# Patient Record
(demographics unavailable — no encounter records)

---

## 2024-12-14 NOTE — HISTORY OF PRESENT ILLNESS
[de-identified] : Patient is a 51-year-old right-hand-dominant male presents with complaints of left biceps pain.  Patient states he was moving his 275 pound dog this morning and felt a tear in his left arm.  Patient states his dog was on the leash and was not cooperating he went to movement felt a tear.  Since that time is complain of pain and swelling and deformity in the biceps muscle.  He is seen with his wife.

## 2024-12-14 NOTE — IMAGING
[Left] : left shoulder [de-identified] : He is alert and oriented vital signs are stable.  Examination left shoulder reveals old healed scars from a Bankart surgery.  He is no tenderness around the sternoclavicular joint AC joint or the into the throat and cuff.  He has tenderness around the biceps proximally.  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  He has an active range of motion of abduction 150 forward elevation 160 internal rotation of 50 external rotation of 60.  Difficulty cooperating with Reinoso and Neer signs with a supraspinatus strength testing of 4+/5  Examination left elbow.  No tenderness.  There is no deformity at the elbow region.  Near full range of motion of the elbow.  He had 5-5 strength on resisted supination.  The biceps tendon appeared intact distally.  He had a normal neurologic exam. Normal vascular exam. Normal skin exam. No evidence for open wounds infection or compartment syndrome. [FreeTextEntry1] : X-rays left shoulder 2 views revealed no fracture or dislocations.  There was early glenohumeral osteoarthritis.

## 2024-12-14 NOTE — ASSESSMENT
[FreeTextEntry1] : Left shoulder rotator cuff strain with proximal biceps tendon tear Mild glenohumeral osteoarthritis  Plan anti-inflammatories with GI precautions ice 20 minutes on 20 minutes off and no lifting or carrying at this time.  And recommending MRI of his left shoulder rule out a proximal biceps tendon tear.  He will follow-up after the MRI with Dr. Kamara one of the shoulder specialists.  All questions were answered they are agreeable with the plan. Patient was advised that he did tear the biceps tendon I did advise him without surgery the deformity would be permanent.    Patient was advised if they develop any severe pain, numbness or tingling or pain with range of motion to the fingers they must call or go to the emergency room immediately. All the signs and symptoms of compartment syndrome were explained.  The patient understands.  This medical record was created utilizing Dragon voice recognition software.  This software is not perfect and may occasionally create typographical errors which may be reflected in the above medical record.

## 2024-12-23 NOTE — HISTORY OF PRESENT ILLNESS
[de-identified] : The patient is a 51 year old RIGHT hand dominant male who presents today complaining of LT bicep pain  Date of Injury/Onset: 24 Pain:    At Rest: 0/10   With Activity:   depends on activity. Mechanism of injury: moving 275 dog and felt pop in LT arm This is NOT a Work Related Injury being treated under Worker's Compensation.  This is NOT an athletic injury occurring associated with an interscholastic or organized sports team.  Quality of symptoms: swelling and louann deformity over LT bicep  Improves with: Ice. Worse with: movement Prior treatment: n/a Prior [facility] @ [facility], [place] Out of work/sport: _, since _  School/Sport/Position/Occupation: .  Additional Information: med hx of bankart repair of LT shoulder approximately 30 years ago.

## 2024-12-23 NOTE — DISCUSSION/SUMMARY
[de-identified] : Physical therapy prescribed for strengthening and stretching. Patient will follow up in 6 weeks.   **no heavy lifting ----------------------------------------------- Home Exercise The patient is instructed on a home exercise program.  JAZMINE ZHOU Acting as a Scribe for Dr. Anh ROMO, Jazmine Zhou, attest that this documentation has been prepared under the direction and in the presence of Provider Brody Kamara MD.  Activity Modification The patient was advised to modify their activities.  Dx / Natural History The patient was advised of the diagnosis.  The natural history of the pathology was explained in full to the patient in layman's terms.  Several different treatment options were discussed and explained in full to the patient including the risks and benefits of both surgical and non-surgical treatments.  All questions and concerns were answered.  Pain Guide Activities The patient was advised to let pain guide the gradual advancement of activities.  JOSÉ ROMO explained to the patient that rest, ice, compression, and elevation would benefit them.  They may return to activity after follow-up or when they no longer have any pain.  The patient's current medication management of their orthopedic diagnosis was reviewed today: (1) We discussed a comprehensive treatment plan that included possible pharmaceutical management involving the use of prescription strength medications including but not limited to options such as oral Naprosyn 500mg BID, once daily Meloxicam 15 mg, or 500-650 mg Tylenol versus over the counter oral medications and topical prescription NSAID Pennsaid vs over the counter Voltaren gel. (2) There is a moderate risk of morbidity with further treatment, especially from use of prescription strength medications and possible side effects of these medications which include upset stomach with oral medications, skin reactions to topical medications and cardiac/renal issues with long term use. (3) I recommended that the patient follow-up with their medical physician to discuss any significant specific potential issues with long term medication use such as interactions with current medications or with exacerbation of underlying medical comorbidities. (4) The benefits and risks associated with use of injectable, oral or topical, prescription and over the counter anti-inflammatory medications were discussed with the patient. The patient voiced understanding of the risks including but not limited to bleeding, stroke, kidney dysfunction, heart disease, and were referred to the black box warning label for further information.

## 2024-12-23 NOTE — DATA REVIEWED
[FreeTextEntry1] : 12/14/24 [facility] X-Ray Examination of the LEFT SHOULDER: 3+ views: Shoulder Comments: X-rays left shoulder 2 views revealed no fracture or dislocations. There was early glenohumeral osteoarthritis.

## 2024-12-23 NOTE — PHYSICAL EXAM
[de-identified] : Neurologic: normal sensation, normal mood and affect, orientated and able to communicate   Constitutional: well developed and well nourished    Left Shoulder: pop eyed deformity full rom  proximal biceps tenderness NVI

## 2025-02-04 NOTE — DISCUSSION/SUMMARY
[de-identified] :  Reviewed all MRI images with patient, interpretation was provided. Patient will continue physical therapy. Patient will follow up in 6 weeks.     **no heavy lifting **History of Bankart repair  **will reassess in 6 weeks, if symptoms progressively gets worse will discuss surgery. ----------------------------------------------- Home Exercise The patient is instructed on a home exercise program.  JAZMINE ZHOU Acting as a Scribe for Dr. Anh ROMO, Jazmine Zhou, attest that this documentation has been prepared under the direction and in the presence of Provider Brody Kamara MD.  Activity Modification The patient was advised to modify their activities.  Dx / Natural History The patient was advised of the diagnosis.  The natural history of the pathology was explained in full to the patient in layman's terms.  Several different treatment options were discussed and explained in full to the patient including the risks and benefits of both surgical and non-surgical treatments.  All questions and concerns were answered.  Pain Guide Activities The patient was advised to let pain guide the gradual advancement of activities.  RICE I explained to the patient that rest, ice, compression, and elevation would benefit them.  They may return to activity after follow-up or when they no longer have any pain.  The patient's current medication management of their orthopedic diagnosis was reviewed today: (1) We discussed a comprehensive treatment plan that included possible pharmaceutical management involving the use of prescription strength medications including but not limited to options such as oral Naprosyn 500mg BID, once daily Meloxicam 15 mg, or 500-650 mg Tylenol versus over the counter oral medications and topical prescription NSAID Pennsaid vs over the counter Voltaren gel. (2) There is a moderate risk of morbidity with further treatment, especially from use of prescription strength medications and possible side effects of these medications which include upset stomach with oral medications, skin reactions to topical medications and cardiac/renal issues with long term use. (3) I recommended that the patient follow-up with their medical physician to discuss any significant specific potential issues with long term medication use such as interactions with current medications or with exacerbation of underlying medical comorbidities. (4) The benefits and risks associated with use of injectable, oral or topical, prescription and over the counter anti-inflammatory medications were discussed with the patient. The patient voiced understanding of the risks including but not limited to bleeding, stroke, kidney dysfunction, heart disease, and were referred to the black box warning label for further information.

## 2025-02-04 NOTE — HISTORY OF PRESENT ILLNESS
[de-identified] : The patient is a 51 year old RIGHT hand dominant male who presents today complaining of LT bicep pain  Date of Injury/Onset: 12/14/24 Pain:    At Rest: 0/10   With Activity:   depends on activity. Mechanism of injury: moving 275 dog and felt pop in LT arm This is NOT a Work Related Injury being treated under Worker's Compensation.  This is NOT an athletic injury occurring associated with an interscholastic or organized sports team.  Quality of symptoms: swelling and louann deformity over LT bicep  Improves with: Ice. Worse with: movement Treatment/Imaging/Studies Since Last Visit: n/a 	Reports Available For Review Today: n/a Change since last visit:  Out of work/sport: _, since _  School/Sport/Position/Occupation: .  Additional Information: med hx of bankart repair of LT shoulder approximately 30 years ago. Patient reports feeling better since last visit and is going PT 2 x week.

## 2025-02-04 NOTE — HISTORY OF PRESENT ILLNESS
[de-identified] : The patient is a 51 year old RIGHT hand dominant male who presents today complaining of LT bicep pain  Date of Injury/Onset: 12/14/24 Pain:    At Rest: 0/10   With Activity:   depends on activity. Mechanism of injury: moving 275 dog and felt pop in LT arm This is NOT a Work Related Injury being treated under Worker's Compensation.  This is NOT an athletic injury occurring associated with an interscholastic or organized sports team.  Quality of symptoms: swelling and louann deformity over LT bicep  Improves with: Ice. Worse with: movement Treatment/Imaging/Studies Since Last Visit: n/a 	Reports Available For Review Today: n/a Change since last visit:  Out of work/sport: _, since _  School/Sport/Position/Occupation: .  Additional Information: med hx of bankart repair of LT shoulder approximately 30 years ago. Patient reports feeling better since last visit and is going PT 2 x week.

## 2025-02-04 NOTE — DISCUSSION/SUMMARY
[de-identified] :  Reviewed all MRI images with patient, interpretation was provided. Patient will continue physical therapy. Patient will follow up in 6 weeks.     **no heavy lifting **History of Bankart repair  **will reassess in 6 weeks, if symptoms progressively gets worse will discuss surgery. ----------------------------------------------- Home Exercise The patient is instructed on a home exercise program.  JAZMINE ZHOU Acting as a Scribe for Dr. Anh ROMO, Jazmine Zhou, attest that this documentation has been prepared under the direction and in the presence of Provider Brody Kamara MD.  Activity Modification The patient was advised to modify their activities.  Dx / Natural History The patient was advised of the diagnosis.  The natural history of the pathology was explained in full to the patient in layman's terms.  Several different treatment options were discussed and explained in full to the patient including the risks and benefits of both surgical and non-surgical treatments.  All questions and concerns were answered.  Pain Guide Activities The patient was advised to let pain guide the gradual advancement of activities.  RICE I explained to the patient that rest, ice, compression, and elevation would benefit them.  They may return to activity after follow-up or when they no longer have any pain.  The patient's current medication management of their orthopedic diagnosis was reviewed today: (1) We discussed a comprehensive treatment plan that included possible pharmaceutical management involving the use of prescription strength medications including but not limited to options such as oral Naprosyn 500mg BID, once daily Meloxicam 15 mg, or 500-650 mg Tylenol versus over the counter oral medications and topical prescription NSAID Pennsaid vs over the counter Voltaren gel. (2) There is a moderate risk of morbidity with further treatment, especially from use of prescription strength medications and possible side effects of these medications which include upset stomach with oral medications, skin reactions to topical medications and cardiac/renal issues with long term use. (3) I recommended that the patient follow-up with their medical physician to discuss any significant specific potential issues with long term medication use such as interactions with current medications or with exacerbation of underlying medical comorbidities. (4) The benefits and risks associated with use of injectable, oral or topical, prescription and over the counter anti-inflammatory medications were discussed with the patient. The patient voiced understanding of the risks including but not limited to bleeding, stroke, kidney dysfunction, heart disease, and were referred to the black box warning label for further information.

## 2025-02-04 NOTE — DATA REVIEWED
[FreeTextEntry1] : 12/14/24 [facility] X-Ray Examination of the LEFT SHOULDER: 3+ views: Shoulder Comments: X-rays left shoulder 2 views revealed no fracture or dislocations. There was early glenohumeral osteoarthritis.  01/09/25 [facility] MRI Left Shoulder: Full-thickness tear of the anterior supraspinatus tendon involving AP thickness of 1.2 cm with tendon fibers retracted 1.8 cm medially. Mild infraspinatus tendinosis with low-grade partial-thickness articular surface tear Moderate subscapularis tendinosis. Near circumferential labral tear, chronic appearing Poor visualization of the long head of biceps tendon in its intra-articular portion which may be due to distally retracted tear

## 2025-02-04 NOTE — PHYSICAL EXAM
[de-identified] : Neurologic: normal sensation, normal mood and affect, orientated and able to communicate   Constitutional: well developed and well nourished    Left Shoulder: pop eyed deformity full rom  proximal biceps tenderness NVI

## 2025-02-04 NOTE — PHYSICAL EXAM
[de-identified] : Neurologic: normal sensation, normal mood and affect, orientated and able to communicate   Constitutional: well developed and well nourished    Left Shoulder: pop eyed deformity full rom  proximal biceps tenderness NVI

## 2025-03-17 NOTE — END OF VISIT
[Time Spent: ___ minutes] : I have spent [unfilled] minutes of time on the encounter which excludes teaching and separately reported services.
26-Dec-2024 18:58

## 2025-03-17 NOTE — PHYSICAL EXAM
[de-identified] : Neurologic: normal sensation, normal mood and affect, orientated and able to communicate   Constitutional: well developed and well nourished    Left Shoulder: pop eyed deformity full rom  proximal biceps tenderness NVI

## 2025-03-17 NOTE — HISTORY OF PRESENT ILLNESS
[de-identified] : The patient is a 51 year old RIGHT hand dominant male who presents today complaining of LT bicep pain  Date of Injury/Onset: 12/14/24 Pain:    At Rest: 0/10   With Activity:   0-1/10 Mechanism of injury: moving 275 dog and felt pop in LT arm This is NOT a Work Related Injury being treated under Worker's Compensation.  This is NOT an athletic injury occurring associated with an interscholastic or organized sports team.  Quality of symptoms: minor symptoms, slight strain when lifting heavy objects.  Improves with: Ice. Worse with: movement Treatment/Imaging/Studies Since Last Visit: n/a 	Reports Available For Review Today: n/a Change since last visit: n/a Out of work/sport: _, since _  School/Sport/Position/Occupation: .  Additional Information: med hx of bankart repair of LT shoulder approximately 30 years ago. Patient reports feeling better since last visit and just finisched PT. He states feeling much better.

## 2025-03-17 NOTE — DISCUSSION/SUMMARY
[de-identified] :  pain significantly improved with PT, patient happy with the progress. continue at home exercises. Patient will follow up as needed.       **Doing pt at [facility]    ----------------------------------------------- Home Exercise The patient is instructed on a home exercise program.  JAZMINE ZHOU Acting as a Scribe for Dr. Anh ROMO, Jazmine Zhou, attest that this documentation has been prepared under the direction and in the presence of Provider Brody Kamara MD.  Activity Modification The patient was advised to modify their activities.  Dx / Natural History The patient was advised of the diagnosis.  The natural history of the pathology was explained in full to the patient in layman's terms.  Several different treatment options were discussed and explained in full to the patient including the risks and benefits of both surgical and non-surgical treatments.  All questions and concerns were answered.  Pain Guide Activities The patient was advised to let pain guide the gradual advancement of activities.  RICE I explained to the patient that rest, ice, compression, and elevation would benefit them.  They may return to activity after follow-up or when they no longer have any pain.  The patient's current medication management of their orthopedic diagnosis was reviewed today: (1) We discussed a comprehensive treatment plan that included possible pharmaceutical management involving the use of prescription strength medications including but not limited to options such as oral Naprosyn 500mg BID, once daily Meloxicam 15 mg, or 500-650 mg Tylenol versus over the counter oral medications and topical prescription NSAID Pennsaid vs over the counter Voltaren gel. (2) There is a moderate risk of morbidity with further treatment, especially from use of prescription strength medications and possible side effects of these medications which include upset stomach with oral medications, skin reactions to topical medications and cardiac/renal issues with long term use. (3) I recommended that the patient follow-up with their medical physician to discuss any significant specific potential issues with long term medication use such as interactions with current medications or with exacerbation of underlying medical comorbidities. (4) The benefits and risks associated with use of injectable, oral or topical, prescription and over the counter anti-inflammatory medications were discussed with the patient. The patient voiced understanding of the risks including but not limited to bleeding, stroke, kidney dysfunction, heart disease, and were referred to the black box warning label for further information.